# Patient Record
Sex: FEMALE | Race: BLACK OR AFRICAN AMERICAN | NOT HISPANIC OR LATINO | Employment: OTHER | URBAN - METROPOLITAN AREA
[De-identification: names, ages, dates, MRNs, and addresses within clinical notes are randomized per-mention and may not be internally consistent; named-entity substitution may affect disease eponyms.]

---

## 2018-12-19 ENCOUNTER — TELEPHONE (OUTPATIENT)
Dept: ENDOCRINOLOGY | Facility: CLINIC | Age: 63
End: 2018-12-19

## 2019-01-21 ENCOUNTER — TELEPHONE (OUTPATIENT)
Dept: ENDOCRINOLOGY | Facility: CLINIC | Age: 64
End: 2019-01-21

## 2019-01-21 NOTE — TELEPHONE ENCOUNTER
Returned Renetta's call but she was not available at the time.   I left a message for her letting her know that we do not have any records for this pt in our system. I left our phone number for her to call me back at.

## 2019-01-21 NOTE — TELEPHONE ENCOUNTER
----- Message from Fidelina Tia sent at 1/21/2019  2:58 PM CST -----  Contact: Renetta lopez/ Dr. Jefferson   tel:[phone] - w/ [facility] ENDO Dept.  Pt.called Dr. Jefferson's ofc, and  says she got a letter from [facility]  Endo  office  Stating to have her records sent to you.    Pt. Was told by Dr. Jefferson's ofc. That [facility] End[facility]. Has the same Epic system and the  [facility] should be able to view her records thru [facility].    Pls call ref. This.

## 2019-01-24 ENCOUNTER — OFFICE VISIT (OUTPATIENT)
Dept: ENDOCRINOLOGY | Facility: CLINIC | Age: 64
End: 2019-01-24
Payer: MEDICARE

## 2019-01-24 VITALS
BODY MASS INDEX: 36.1 KG/M2 | SYSTOLIC BLOOD PRESSURE: 130 MMHG | WEIGHT: 216.69 LBS | DIASTOLIC BLOOD PRESSURE: 80 MMHG | HEIGHT: 65 IN

## 2019-01-24 DIAGNOSIS — E11.9 TYPE 2 DIABETES MELLITUS WITHOUT COMPLICATION, WITH LONG-TERM CURRENT USE OF INSULIN: ICD-10-CM

## 2019-01-24 DIAGNOSIS — E28.8 HYPERANDROGENISM: ICD-10-CM

## 2019-01-24 DIAGNOSIS — Z79.4 TYPE 2 DIABETES MELLITUS WITHOUT COMPLICATION, WITH LONG-TERM CURRENT USE OF INSULIN: ICD-10-CM

## 2019-01-24 DIAGNOSIS — E66.01 MORBID OBESITY: ICD-10-CM

## 2019-01-24 DIAGNOSIS — E27.8 ADRENAL CORTICAL NODULE: ICD-10-CM

## 2019-01-24 PROCEDURE — 99204 OFFICE O/P NEW MOD 45 MIN: CPT | Mod: S$PBB,,, | Performed by: INTERNAL MEDICINE

## 2019-01-24 PROCEDURE — 99204 PR OFFICE/OUTPT VISIT, NEW, LEVL IV, 45-59 MIN: ICD-10-PCS | Mod: S$PBB,,, | Performed by: INTERNAL MEDICINE

## 2019-01-24 PROCEDURE — 99999 PR PBB SHADOW E&M-EST. PATIENT-LVL III: ICD-10-PCS | Mod: PBBFAC,,, | Performed by: INTERNAL MEDICINE

## 2019-01-24 PROCEDURE — 99213 OFFICE O/P EST LOW 20 MIN: CPT | Mod: PBBFAC | Performed by: INTERNAL MEDICINE

## 2019-01-24 PROCEDURE — 99999 PR PBB SHADOW E&M-EST. PATIENT-LVL III: CPT | Mod: PBBFAC,,, | Performed by: INTERNAL MEDICINE

## 2019-01-24 RX ORDER — VALSARTAN AND HYDROCHLOROTHIAZIDE 160; 25 MG/1; MG/1
1 TABLET ORAL DAILY
COMMUNITY

## 2019-01-24 RX ORDER — INSULIN ASPART 100 [IU]/ML
100 INJECTION, SUSPENSION SUBCUTANEOUS 2 TIMES DAILY
Refills: 3 | COMMUNITY
Start: 2018-11-13

## 2019-01-24 RX ORDER — INSULIN LISPRO 100 [IU]/ML
100 INJECTION, SOLUTION INTRAVENOUS; SUBCUTANEOUS DAILY PRN
COMMUNITY

## 2019-01-24 RX ORDER — IBUPROFEN 800 MG/1
800 TABLET ORAL DAILY PRN
Refills: 3 | COMMUNITY
Start: 2018-11-27

## 2019-01-24 RX ORDER — GABAPENTIN 300 MG/1
300 CAPSULE ORAL DAILY
COMMUNITY
Start: 2017-03-08

## 2019-01-24 RX ORDER — PITAVASTATIN CALCIUM 2.09 MG/1
2 TABLET, FILM COATED ORAL DAILY
COMMUNITY

## 2019-01-24 RX ORDER — HYDROCODONE BITARTRATE AND ACETAMINOPHEN 10; 325 MG/1; MG/1
10 TABLET ORAL DAILY
COMMUNITY
Start: 2018-09-10

## 2019-01-24 RX ORDER — EZETIMIBE 10 MG/1
10 TABLET ORAL DAILY
COMMUNITY
Start: 2016-11-29

## 2019-01-24 NOTE — ASSESSMENT & PLAN NOTE
Patient has moderate hyperandrogenism and hirsutism on exam.    Low DHEAS in the setting of elevated testosterone argues against an adrenal source for the elevated testosterone.    Given indolent course and onset after menopause, ovarian hyperthecosis is at the top of the differential. The preferred treatment after menopause is bilateral oophorectomy, and patient is generally agreeable. I explained that this may not reverse the abnormal hair growth, but may prevent it from getting worse, and may help control some of the metabolic effects of elevated testosterone, namely her insulin resistance.    We are going to discuss the case at our conference next week, and I will be in touch with her regarding the consensus.     I don't think any further hormonal testing or imaging is required at this time (other than ruling out subclinical hypercortisolism).

## 2019-01-24 NOTE — PROGRESS NOTES
"Subjective:      Chief Complaint: Consult for hirsutism    HPI: Aleida Alcantara is a 64 y.o. female who is here for an initial evaluation for hirsutism post-menopause.    Starting about 10 years ago just after menopause, she noticed course hair on her upper lip, chin, lower abdomen and around the right nipple. The hair growth started off mild and she was initially just plucking the hairs. This resulted in folliculitis and "keloid" formation, so she was advised to start shaving instead. After she started shaving, the hair growth worsened, which she attributes to the shaving. She went back to plucking the hairs more recently. She saw her GYN about the abnormal hair growth in 2018, and was found to have an elevated testosterone level. She was started on spironolactone at that point, but developed severe muscle cramps in her legs and then stopped it. Additionally, she's had progressive hair loss in a male-pattern on the top of her head. She denies any changes in the size of her clitorus or deepening of her voice, although she's always had a deeper tone to her voice. She underwent menopause at age ~54. Prior, she reports normal menses and no issues with fertility (4 children). No history of PCOS prior to menopause. Family history is significant for both parents with type 2 diabetes and 4 of her 6 siblings with type 2 diabetes. All were diagnosed as adults. Her mother had abnormal hair growth on the chin, and one of her sisters (who doesn't have diabetes) also has abnormal hair growth, but neither are as severe as hers.    Review of outside records reveals:  Testosterone (14-76):  151, repeat 130    DHEA sulfate (29.7-182.2):  18.7, repeat 20.6  PRL normal  TSH normal  CMP normal, except bicarb of 31 and potassium 3.7.  Renin                     3.4  Aldosterone            6.4  Estradiol                 20.0  LH                          13.8  FSH                       32.3  17-OHP (<250)      88    She turned in a 24 hour " "urine for metanephrines and cortisol just before Dorothy, but hasn't receive that result yet. She is going to call the lab and will send it to me when she gets the result.    Transvaginal ultrasound:  Multiple uterine fibroids  R ovary - 20 mm x 22 mm x 18 mm  L ovary - 23 mm x 25 mm x 16 mm  No ovarian follicles or masses identified.    CT Abd/Pelvis - comparison made to CT in 2008 and 2009:  "Stable, nodular thickening is seen on both adrenal glands. Left adrenal gland adenoma measuring 1.1 x 0.9 cm. Right adrenal gland nodule measuring 1.7 x 0.8 cm is seen."  L adrenal nodule - absolute washout 51.5% - precontrast HU 18  R adrenal nodule - absolute washout 28.6% - precontrast HU 20    Both nodules reportedly stable over time period (~10 years)  Additional findings included a fatty liver.    With regards to diabetes:  She takes 70/30 insulin 40 units BID and reports somewhat decent control recently. She gets spinal injections periodically so she uses humalog in that time period to control her blood glucose.  She sees her PCP and endocrinologist for diabetes care/health maintenance and is not here specifically to address her diabetes today.    Reviewed past medical, family, social history and updated as appropriate.    Review of Systems   Constitutional: Negative for unexpected weight change.   HENT: Negative for voice change.    Eyes: Negative for visual disturbance.   Respiratory: Negative for shortness of breath.    Cardiovascular: Negative for chest pain.   Gastrointestinal: Negative for abdominal pain.   Genitourinary: Negative for urgency and vaginal bleeding.        No clitoromegaly    Musculoskeletal: Negative for arthralgias.   Skin: Negative for wound.   Neurological: Negative for headaches.   Hematological: Does not bruise/bleed easily.   Psychiatric/Behavioral: Negative for sleep disturbance.   All other systems reviewed and are negative.    Objective:     Vitals:    01/24/19 1308   BP: 130/80 "     Physical Exam   Constitutional: She is oriented to person, place, and time. She appears well-developed. No distress.   Generally obese, including centrally and peripherally   HENT:   Right Ear: External ear normal.   Left Ear: External ear normal.   Nose: Nose normal.   Voice is deeper than average, but likely within the normal range for females.    Eyes: Conjunctivae are normal. Right eye exhibits no discharge. Left eye exhibits no discharge.   Neck: No tracheal deviation present.   Cardiovascular: Normal rate.   No murmur heard.  Pulmonary/Chest: Effort normal and breath sounds normal.   Abdominal: Soft. She exhibits no mass. There is no tenderness.   Musculoskeletal: She exhibits no edema.   No digital clubbing or extremity cyanosis   Neurological: She is alert and oriented to person, place, and time. Coordination normal.   Skin: No rash noted.   Terminal hairs on chin, chest, abdomen were not present due to recent plucking.  Bitemporal hair thinning.  +Acanthosis nigricans in the bilateral axillary folds L>R  +Multiple skin tags near the neck line bilaterally  Excess abdominal skin with previous surgical incision  Multiple vertical stretch marks with normal pigmentation (present since pregnancy per patient).   Psychiatric: She has a normal mood and affect. Her behavior is normal.   Alert and oriented to person, place, and situation.   Nursing note and vitals reviewed.    Wt Readings from Last 10 Encounters:   01/24/19 1308 98.3 kg (216 lb 11.4 oz)       Assessment/Plan:     Type 2 diabetes mellitus, with long-term current use of insulin  Patient has signs of insulin resistance on exam, which is a hallmark of ovarian hyperthecosis. See above.    Hyperandrogenism  Patient has moderate hyperandrogenism and hirsutism on exam.    Low DHEAS in the setting of elevated testosterone argues against an adrenal source for the elevated testosterone.    Given indolent course and onset after menopause, ovarian  hyperthecosis is at the top of the differential. The preferred treatment after menopause is bilateral oophorectomy, and patient is generally agreeable. I explained that this may not reverse the abnormal hair growth, but may prevent it from getting worse, and may help control some of the metabolic effects of elevated testosterone, namely her insulin resistance.    We are going to discuss the case at our conference next week, and I will be in touch with her regarding the consensus.     I don't think any further hormonal testing or imaging is required at this time (other than ruling out subclinical hypercortisolism).    Bilateral adrenal cortical nodules  Given small size and stability over at least a decade, the chance of these being malignant is close to zero.    Renin/veronica is normal.  24 hour urine for cortisol and metanephrines is pending. She is going to call the lab and let me know when the results are back for review.  If they come back normal, we will consider a 1 mg DST to more definitively rule out subclinical Cushing's. No signs/symptoms of overt Cushings or pheo.    Further imaging with adrenal MRI could be considered, although I still think these represent benign adenomas regardless of the washout seen on CT.    Morbid obesity  Common in ovarian hyperthecosis    We will discuss the case at conference next week, then contact the patient and referring physician with our recommendations.    I discussed the case with Dr. Cobos and he is in agreement with the plan.

## 2019-01-24 NOTE — ASSESSMENT & PLAN NOTE
Patient has signs of insulin resistance on exam, which is a hallmark of ovarian hyperthecosis. See above.

## 2019-01-24 NOTE — ASSESSMENT & PLAN NOTE
Given small size and stability over at least a decade, the chance of these being malignant is close to zero.    Renin/veronica is normal.  24 hour urine for cortisol and metanephrines is pending. She is going to call the lab and let me know when the results are back for review.  If they come back normal, we will consider a 1 mg DST to more definitively rule out subclinical Cushing's. No signs/symptoms of overt Cushings or pheo.    Further imaging with adrenal MRI could be considered, although I still think these represent benign adenomas regardless of the washout seen on CT.

## 2019-02-07 ENCOUNTER — TELEPHONE (OUTPATIENT)
Dept: ENDOCRINOLOGY | Facility: CLINIC | Age: 64
End: 2019-02-07

## 2019-02-07 NOTE — TELEPHONE ENCOUNTER
----- Message from Madina Gunter sent at 2/7/2019  3:01 PM CST -----  Contact: Self [phone]  PT is requesting a call at [phone].     Dr. Mendoza at [facility] is requesting summary of visit.

## 2019-02-07 NOTE — TELEPHONE ENCOUNTER
Returned pt's call.   Pt stated she needs us to send a visit summary from her appointment with us on 1/24/19. I tried to ask her if she has filled out a release of information form but she said she will have to call me back later.

## 2019-03-08 ENCOUNTER — TELEPHONE (OUTPATIENT)
Dept: ENDOCRINOLOGY | Facility: CLINIC | Age: 64
End: 2019-03-08

## 2019-03-08 NOTE — TELEPHONE ENCOUNTER
I spoke with Ms. Alcantara regarding the 24 hour urine test that's still pending for cortisol. She still hasn't received the result. I advised her to call the lab to see if they have the result and to send it to me if she gets it. She also told me she is having the 1mg DST done next week.     Overall, she's still not sure she wants to have the oophorectomy, but she is considering it. I answered some questions she had regarding the benefits/risks.    She said she will fax over the results of her testing once she has them.

## 2019-11-22 ENCOUNTER — TELEPHONE (OUTPATIENT)
Dept: ENDOCRINOLOGY | Facility: CLINIC | Age: 64
End: 2019-11-22

## 2022-02-15 NOTE — ASSESSMENT & PLAN NOTE
Common in ovarian hyperthecosis   Up to bathroom with one assist without difficulty and voided. Instructd on tito-care.

## 2022-11-21 ENCOUNTER — TELEPHONE (OUTPATIENT)
Dept: ENDOCRINOLOGY | Facility: CLINIC | Age: 67
End: 2022-11-21
Payer: COMMERCIAL

## 2022-11-21 NOTE — TELEPHONE ENCOUNTER
----- Message from Jeannie Rios sent at 11/21/2022 11:32 AM CST -----  [facility] is trying to schedule an appt for pt due to high testosterone levels. No available appts populate in system for me to assist with scheduling.     Confirmed contact below:  Contact Name:Aleida Alcantara  Phone Number: [phone]

## 2022-11-21 NOTE — TELEPHONE ENCOUNTER
Left message for patient that they are scheduled with Endocrinology , if this day and time does not work to please give us a call back at [phone]. Thank you an have a great day.

## 2023-03-22 ENCOUNTER — TELEPHONE (OUTPATIENT)
Dept: ENDOCRINOLOGY | Facility: CLINIC | Age: 68
End: 2023-03-22

## 2023-03-22 NOTE — TELEPHONE ENCOUNTER
----- Message from Akila Bland sent at 3/22/2023  2:55 PM CDT -----  Contact: [phone]  Pt calling to get the nurse to give her a call bk asap  please advise [phone]

## 2023-06-06 ENCOUNTER — OFFICE VISIT (OUTPATIENT)
Dept: ENDOCRINOLOGY | Facility: CLINIC | Age: 68
End: 2023-06-06
Payer: MEDICARE

## 2023-06-06 VITALS
DIASTOLIC BLOOD PRESSURE: 82 MMHG | BODY MASS INDEX: 35.52 KG/M2 | HEART RATE: 87 BPM | OXYGEN SATURATION: 99 % | SYSTOLIC BLOOD PRESSURE: 132 MMHG | HEIGHT: 65 IN | TEMPERATURE: 99 F | WEIGHT: 213.19 LBS

## 2023-06-06 DIAGNOSIS — E27.8 ADRENAL NODULE: ICD-10-CM

## 2023-06-06 DIAGNOSIS — E28.8 HYPERANDROGENISM: ICD-10-CM

## 2023-06-06 DIAGNOSIS — Z79.4 TYPE 2 DIABETES MELLITUS WITH HYPERGLYCEMIA, WITH LONG-TERM CURRENT USE OF INSULIN: Primary | ICD-10-CM

## 2023-06-06 DIAGNOSIS — E11.65 TYPE 2 DIABETES MELLITUS WITH HYPERGLYCEMIA, WITH LONG-TERM CURRENT USE OF INSULIN: Primary | ICD-10-CM

## 2023-06-06 DIAGNOSIS — E61.1 IRON DEFICIENCY: ICD-10-CM

## 2023-06-06 DIAGNOSIS — E53.8 VITAMIN B 12 DEFICIENCY: ICD-10-CM

## 2023-06-06 DIAGNOSIS — E27.8 OTHER SPECIFIED DISORDERS OF ADRENAL GLAND: ICD-10-CM

## 2023-06-06 PROCEDURE — 99999 PR PBB SHADOW E&M-EST. PATIENT-LVL III: CPT | Mod: PBBFAC,,, | Performed by: PHYSICIAN ASSISTANT

## 2023-06-06 PROCEDURE — 99204 PR OFFICE/OUTPT VISIT, NEW, LEVL IV, 45-59 MIN: ICD-10-PCS | Mod: S$PBB,,, | Performed by: PHYSICIAN ASSISTANT

## 2023-06-06 PROCEDURE — 99204 OFFICE O/P NEW MOD 45 MIN: CPT | Mod: S$PBB,,, | Performed by: PHYSICIAN ASSISTANT

## 2023-06-06 PROCEDURE — 99999 PR PBB SHADOW E&M-EST. PATIENT-LVL III: ICD-10-PCS | Mod: PBBFAC,,, | Performed by: PHYSICIAN ASSISTANT

## 2023-06-06 PROCEDURE — 99213 OFFICE O/P EST LOW 20 MIN: CPT | Mod: PBBFAC,PO | Performed by: PHYSICIAN ASSISTANT

## 2023-06-06 RX ORDER — SEMAGLUTIDE 1.34 MG/ML
0.5 INJECTION, SOLUTION SUBCUTANEOUS
Qty: 1 EACH | Refills: 11 | Status: SHIPPED | OUTPATIENT
Start: 2023-06-06 | End: 2024-06-05

## 2023-06-06 NOTE — PROGRESS NOTES
"   Chief Complaint: Elevated Testosterone    HPI: Aleida Alcantara is a 68 y.o. female who is here for an initial evaluation for hirsutism post-menopause.    Last seen by Dr. Delatorre in 1/19.    Starting about 10 years ago just after menopause, she noticed course hair on her upper lip, chin, lower abdomen and around the right nipple. The hair growth started off mild and she was initially just plucking the hairs. This resulted in folliculitis and "keloid" formation, so she was advised to start shaving instead. After she started shaving, the hair growth worsened, which she attributes to the shaving. She shaves daily. She saw her GYN about the abnormal hair growth in 2018, and was found to have an elevated testosterone level. She was started on spironolactone at that point, but developed severe muscle cramps in her legs and then stopped it. Additionally, she's had progressive hair loss in a male-pattern on the top of her head. She denies any changes in the size of her clitorus or deepening of her voice, although she's always had a deeper tone to her voice. She underwent menopause at age ~54. No acne.     Prior, she reports normal menses and no issues with fertility (4 children). No history of PCOS prior to menopause. Family history is significant for both parents with type 2 diabetes and 4 of her 6 siblings with type 2 diabetes. All were diagnosed as adults. Her mother had abnormal hair growth on the chin, and one of her sisters (who doesn't have diabetes) also has abnormal hair growth, but neither are as severe as hers.    Review of outside records reveals:  Testosterone (14-76):  151, repeat 130    DHEA sulfate (29.7-182.2):  18.7, repeat 20.6  PRL normal  TSH normal  CMP normal, except bicarb of 31 and potassium 3.7.  Renin                     3.4  Aldosterone            6.4  Estradiol                 20.0  LH                          13.8  FSH                       32.3  17-OHP (<250)      88    Transvaginal " "ultrasound:  Multiple uterine fibroids  R ovary - 20 mm x 22 mm x 18 mm  L ovary - 23 mm x 25 mm x 16 mm  No ovarian follicles or masses identified.    CT Abd/Pelvis - comparison made to CT in 2008 and 2009:  "Stable, nodular thickening is seen on both adrenal glands. Left adrenal gland adenoma measuring 1.1 x 0.9 cm. Right adrenal gland nodule measuring 1.7 x 0.8 cm is seen."  L adrenal nodule - absolute washout 51.5% - precontrast HU 18  R adrenal nodule - absolute washout 28.6% - precontrast HU 20    Both nodules reportedly stable over time period (~10 years)  Additional findings included a fatty liver.    + occ HA, no palpations or sweating. No easy bruising or muscle weakness.      With regards to diabetes:  Dx 20 years ago.   She takes 70/30 insulin 40 units BID and reports somewhat decent control recently. Occ taking Humalog. Last a1c was 6.7% in 5/23. She gets spinal injections periodically so she uses humalog in that time period to control her blood glucose.  She sees her PCP and endocrinologist for diabetes care/health maintenance and is not here specifically to address her diabetes today.  Eye exam: 4/23 q 6 mths Dr. Tejeda    Reviewed past medical, family, social history and updated as appropriate.    Wt Readings from Last 15 Encounters:   06/06/23 96.7 kg (213 lb 3 oz)   01/24/19 98.3 kg (216 lb 11.4 oz)      ROS:   Constitutional: + fatigue  Eyes: No recent visual changes  Cardiovascular: Denies current anginal symptoms  Respiratory: Denies current respiratory difficulty  Gastrointestinal: Denies recent bowel disturbances  GenitoUrinary - no clitormegaly, No dysuria  Skin: No new skin rash  Neurologic: No focal neurologic complaints  Remainder ROS negative     Objective:     Vitals:    06/06/23 1327   BP: 132/82   Pulse: 87   Temp: 98.5 °F (36.9 °C)     Physical Exam  Vitals and nursing note reviewed.   Constitutional:       General: She is not in acute distress.     Appearance: She is well-developed. "      Comments: Generally obese, including centrally and peripherally   HENT:      Right Ear: External ear normal.      Left Ear: External ear normal.      Nose: Nose normal.   Eyes:      General:         Right eye: No discharge.         Left eye: No discharge.      Conjunctiva/sclera: Conjunctivae normal.   Neck:      Trachea: No tracheal deviation.   Cardiovascular:      Rate and Rhythm: Normal rate.      Heart sounds: No murmur heard.  Pulmonary:      Effort: Pulmonary effort is normal.      Breath sounds: Normal breath sounds.   Abdominal:      Palpations: Abdomen is soft. There is no mass.      Tenderness: There is no abdominal tenderness.   Musculoskeletal:      Comments: No digital clubbing or extremity cyanosis   Skin:     Findings: No rash.      Comments: Terminal hairs on chin, chest, abdomen were not present due to recent plucking.  Bitemporal hair thinning.  +Acanthosis nigricans in the bilateral axillary folds L>R  +Multiple skin tags near the neck line bilaterally  Excess abdominal skin with previous surgical incision  Multiple vertical stretch marks with normal pigmentation (present since pregnancy per patient).   Neurological:      Mental Status: She is alert and oriented to person, place, and time.      Coordination: Coordination normal.   Psychiatric:         Behavior: Behavior normal.      Comments: Alert and oriented to person, place, and situation.     6/23  Foot Exam: no sores or macerations noted.     Protective Sensation (w/ 10 gram monofilament):  Right: Intact  Left: Intact    Visual Inspection:  Normal -  Bilateral and Nails Intact - without Evidence of Foot Deformity- Bilateral    Pedal Pulses:   Right: Present  Left: Present    Posterior Tibialis Pulses:   Right:Present  Left: Present     Vibratory Sensation  Right:Decreased  Left:Decreased    Wt Readings from Last 10 Encounters:   06/06/23 1327 96.7 kg (213 lb 3 oz)   01/24/19 1308 98.3 kg (216 lb 11.4 oz)     1. Type 2 diabetes mellitus  with hyperglycemia, with long-term current use of insulin  T4, Free    TSH      2. Hyperandrogenism  DHEA-Sulfate    Testosterone Panel    T4, Free    TSH    17-Hydroxyprogesterone    Chromogranin A      3. Adrenal nodule  Aldosterone/Renin Activity Ratio    DHEA    Estrogens, fractionated    Cortisol, Saliva    Cortisol, Saliva    Metanephrines, Plasma Free      4. Iron deficiency  Iron and TIBC      5. Vitamin B 12 deficiency  Vitamin B12         Type 2 diabetes mellitus, with long-term current use of insulin  Start Ozempic 0.25 mg once weekly x 4 weeks.  At week 5 increase to 0.5 mg weekly    No fmh MEN or medullary thyroid cancer  No personal hx of pancreatitis.  Continue insulin.     Patient has signs of insulin resistance on exam, which is a hallmark of ovarian hyperthecosis. See above.     Hyperandrogenism  Patient has moderate hyperandrogenism and hirsutism on exam.     Low DHEAS in the setting of elevated testosterone argues against an adrenal source for the elevated testosterone.     Given indolent course and onset after menopause, ovarian hyperthecosis is at the top of the differential. The preferred treatment after menopause is bilateral oophorectomy, and patient is generally agreeable. I explained that this may not reverse the abnormal hair growth, but may prevent it from getting worse, and may help control some of the metabolic effects of elevated testosterone, namely her insulin resistance.    Adrenal Nodules  Repeat hormonal workup and CT scan    Iron deficiency-check iron  Vitamin B 12 deficiency-check vitamin  b12    Fasting labs  CT scan  F/u prn

## 2023-06-07 NOTE — TELEPHONE ENCOUNTER
Can you send the Rx. for Ozempic to [facility] & they will submit the prior authorization. Once approved the pharmacy will call the patient & give them the option to mail the medication to them or transfer it back to the pharmacy of their choice. If denied they will process the appeal if possible.

## 2023-06-08 RX ORDER — SEMAGLUTIDE 1.34 MG/ML
0.5 INJECTION, SOLUTION SUBCUTANEOUS
Qty: 1 EACH | Refills: 11 | OUTPATIENT
Start: 2023-06-08 | End: 2024-06-07